# Patient Record
Sex: MALE | Race: WHITE
[De-identification: names, ages, dates, MRNs, and addresses within clinical notes are randomized per-mention and may not be internally consistent; named-entity substitution may affect disease eponyms.]

---

## 2019-07-01 ENCOUNTER — HOSPITAL ENCOUNTER (OUTPATIENT)
Dept: HOSPITAL 92 - RAD-FRANK | Age: 49
Discharge: HOME | End: 2019-07-01
Attending: NURSE PRACTITIONER
Payer: COMMERCIAL

## 2019-07-01 DIAGNOSIS — M79.671: Primary | ICD-10-CM

## 2019-07-01 DIAGNOSIS — M77.31: ICD-10-CM

## 2019-07-01 NOTE — RAD
RIGHT CALCANEUS TWO VIEWS:

 

INDICATIONS:

Pain.

 

FINDINGS:

No fracture or dislocation.  There is a focal lucency of the anterior process of the calcaneus with f
aint internal trabeculations superimposed.  There is mild enthesophyte formation at the dorsal aspect
 of the calcaneus.

 

IMPRESSION:

1.  No acute fracture of the calcaneus of the right foot.

 

2.  Focal circumscribed relative lucency at the anterior aspect of the calcaneus.  This may relate to
 a cyst, although recommend pre and post contrast MRI to confirm.

 

3.  Mild dorsal calcaneal enthesophyte formation.

 

POS: NWK